# Patient Record
Sex: FEMALE | URBAN - METROPOLITAN AREA
[De-identification: names, ages, dates, MRNs, and addresses within clinical notes are randomized per-mention and may not be internally consistent; named-entity substitution may affect disease eponyms.]

---

## 2019-07-23 ENCOUNTER — OFFICE VISIT (OUTPATIENT)
Dept: URBAN - METROPOLITAN AREA CLINIC 33 | Facility: CLINIC | Age: 39
End: 2019-07-23
Payer: COMMERCIAL

## 2019-07-23 DIAGNOSIS — S05.01XA CORNEAL ABRASION W/O FB OF RIGHT EYE, INITIAL ENCOUNTER: Primary | ICD-10-CM

## 2019-07-23 PROCEDURE — 99203 OFFICE O/P NEW LOW 30 MIN: CPT | Performed by: OPTOMETRIST

## 2019-07-23 RX ORDER — NEOMYCIN SULFATE, POLYMYXIN B SULFATE AND DEXAMETHASONE 3.5; 10000; 1 MG/ML; [USP'U]/ML; MG/ML
SUSPENSION OPHTHALMIC
Qty: 3.5 | Refills: 0 | Status: ACTIVE
Start: 2019-07-23

## 2019-07-23 ASSESSMENT — INTRAOCULAR PRESSURE: OS: 16

## 2019-07-23 NOTE — IMPRESSION/PLAN
Impression: Corneal abrasion w/o FB of right eye, initial encounter: S05. 01XA. Plan: Corneal abrasion of right eye, resolving. Recommend patient start Maxitrol TID OD and ATs BID OU. RXd Maxitrol TID OD

## 2019-07-25 ENCOUNTER — OFFICE VISIT (OUTPATIENT)
Dept: URBAN - METROPOLITAN AREA CLINIC 33 | Facility: CLINIC | Age: 39
End: 2019-07-25
Payer: COMMERCIAL

## 2019-07-25 DIAGNOSIS — S05.01XD CORNEAL ABRASION W/O FB OF RIGHT EYE, SUBSEQUENT ENCOUNTER: Primary | ICD-10-CM

## 2019-07-25 PROCEDURE — 99213 OFFICE O/P EST LOW 20 MIN: CPT | Performed by: OPTOMETRIST

## 2019-07-25 ASSESSMENT — INTRAOCULAR PRESSURE
OD: 16
OS: 18

## 2019-07-25 NOTE — IMPRESSION/PLAN
Impression: Corneal abrasion w/o FB of right eye, subsequent encounter: S05. 01XD. Plan: Resolving corneal abrasion of right eye, with corneal haze. Advised patient to continue Maxitrol for 5 more days and D/C. If haze is still present after 2 weeks, patient can return for follow up.

## 2019-09-03 ENCOUNTER — OFFICE VISIT (OUTPATIENT)
Dept: URBAN - METROPOLITAN AREA CLINIC 33 | Facility: CLINIC | Age: 39
End: 2019-09-03
Payer: COMMERCIAL

## 2019-09-03 DIAGNOSIS — H18.831 RECURRENT EROSION OF CORNEA, RIGHT EYE: Primary | ICD-10-CM

## 2019-09-03 PROCEDURE — 99213 OFFICE O/P EST LOW 20 MIN: CPT | Performed by: OPTOMETRIST

## 2019-09-03 ASSESSMENT — INTRAOCULAR PRESSURE
OS: 15
OD: 15

## 2019-09-03 NOTE — IMPRESSION/PLAN
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 Plan: Recurrent erosion of OD. Initial injury occurred when toddler accidently scratched OD. Discussed condition and recommend patient uses artificial tears throughout the day and gel ointment at night for comfort.   

Restart Maxitrol TID OD

## 2022-08-19 ENCOUNTER — OFFICE VISIT (OUTPATIENT)
Dept: URBAN - METROPOLITAN AREA CLINIC 33 | Facility: CLINIC | Age: 42
End: 2022-08-19
Payer: COMMERCIAL

## 2022-08-19 DIAGNOSIS — H53.143 BILATERAL ASTHENOPIA: Primary | ICD-10-CM

## 2022-08-19 PROCEDURE — 99213 OFFICE O/P EST LOW 20 MIN: CPT | Performed by: OPTOMETRIST

## 2022-08-19 ASSESSMENT — INTRAOCULAR PRESSURE
OD: 19
OS: 21

## 2022-08-19 ASSESSMENT — VISUAL ACUITY
OD: 20/20
OS: 20/20

## 2022-08-19 ASSESSMENT — KERATOMETRY
OS: 43.88
OD: 43.38

## 2022-08-19 NOTE — IMPRESSION/PLAN
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 Plan: Discussed digital eye strain. Recommend patient takes computer breaks and starts artificial tears. Patient to continue with computer glasses at this time.

## 2023-12-05 ENCOUNTER — OFFICE VISIT (OUTPATIENT)
Dept: URBAN - METROPOLITAN AREA CLINIC 33 | Facility: CLINIC | Age: 43
End: 2023-12-05
Payer: COMMERCIAL

## 2023-12-05 DIAGNOSIS — H52.03 HYPERMETROPIA, BILATERAL: Primary | ICD-10-CM

## 2023-12-05 PROCEDURE — 92014 COMPRE OPH EXAM EST PT 1/>: CPT

## 2023-12-05 ASSESSMENT — INTRAOCULAR PRESSURE
OS: 20
OD: 21

## 2023-12-05 ASSESSMENT — VISUAL ACUITY
OS: 20/20
OD: 20/20

## 2025-06-10 ENCOUNTER — OFFICE VISIT (OUTPATIENT)
Dept: URBAN - METROPOLITAN AREA CLINIC 33 | Facility: CLINIC | Age: 45
End: 2025-06-10
Payer: COMMERCIAL

## 2025-06-10 DIAGNOSIS — H52.03 HYPERMETROPIA, BILATERAL: Primary | ICD-10-CM

## 2025-06-10 PROCEDURE — 92014 COMPRE OPH EXAM EST PT 1/>: CPT

## 2025-06-10 ASSESSMENT — INTRAOCULAR PRESSURE
OD: 19
OS: 19

## 2025-06-10 ASSESSMENT — VISUAL ACUITY
OD: 20/20
OS: 20/20